# Patient Record
Sex: MALE | Race: WHITE | HISPANIC OR LATINO | Employment: FULL TIME | URBAN - METROPOLITAN AREA
[De-identification: names, ages, dates, MRNs, and addresses within clinical notes are randomized per-mention and may not be internally consistent; named-entity substitution may affect disease eponyms.]

---
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
## 2021-02-03 ENCOUNTER — HOSPITAL ENCOUNTER (OUTPATIENT)
Facility: MEDICAL CENTER | Age: 21
End: 2021-02-03
Attending: PHYSICIAN ASSISTANT
Payer: COMMERCIAL

## 2021-02-03 ENCOUNTER — OFFICE VISIT (OUTPATIENT)
Dept: URGENT CARE | Facility: CLINIC | Age: 21
End: 2021-02-03
Payer: COMMERCIAL

## 2021-02-03 VITALS
SYSTOLIC BLOOD PRESSURE: 112 MMHG | TEMPERATURE: 97 F | DIASTOLIC BLOOD PRESSURE: 76 MMHG | WEIGHT: 135 LBS | OXYGEN SATURATION: 97 % | HEIGHT: 69 IN | HEART RATE: 80 BPM | RESPIRATION RATE: 12 BRPM | BODY MASS INDEX: 19.99 KG/M2

## 2021-02-03 DIAGNOSIS — R36.9 PENILE DISCHARGE: ICD-10-CM

## 2021-02-03 DIAGNOSIS — R36.9 PENILE DISCHARGE: Primary | ICD-10-CM

## 2021-02-03 DIAGNOSIS — B08.1 MOLLUSCUM CONTAGIOSUM: ICD-10-CM

## 2021-02-03 DIAGNOSIS — Z20.2 POSSIBLE EXPOSURE TO STD: ICD-10-CM

## 2021-02-03 LAB
APPEARANCE UR: NORMAL
BILIRUB UR STRIP-MCNC: NEGATIVE MG/DL
COLOR UR AUTO: YELLOW
GLUCOSE UR STRIP.AUTO-MCNC: NEGATIVE MG/DL
KETONES UR STRIP.AUTO-MCNC: NORMAL MG/DL
LEUKOCYTE ESTERASE UR QL STRIP.AUTO: NORMAL
NITRITE UR QL STRIP.AUTO: NEGATIVE
PH UR STRIP.AUTO: 7.5 [PH] (ref 5–8)
PROT UR QL STRIP: NORMAL MG/DL
RBC UR QL AUTO: NORMAL
SP GR UR STRIP.AUTO: 1.02
UROBILINOGEN UR STRIP-MCNC: 0.2 MG/DL

## 2021-02-03 PROCEDURE — 87491 CHLMYD TRACH DNA AMP PROBE: CPT

## 2021-02-03 PROCEDURE — 99214 OFFICE O/P EST MOD 30 MIN: CPT | Mod: 25 | Performed by: PHYSICIAN ASSISTANT

## 2021-02-03 PROCEDURE — 81002 URINALYSIS NONAUTO W/O SCOPE: CPT | Performed by: PHYSICIAN ASSISTANT

## 2021-02-03 PROCEDURE — 87591 N.GONORRHOEAE DNA AMP PROB: CPT

## 2021-02-03 RX ORDER — AZITHROMYCIN 500 MG/1
1000 TABLET, FILM COATED ORAL ONCE
Qty: 2 TAB | Refills: 0 | Status: SHIPPED | OUTPATIENT
Start: 2021-02-03 | End: 2021-02-03

## 2021-02-03 ASSESSMENT — ENCOUNTER SYMPTOMS
FLANK PAIN: 0
FEVER: 0
CHILLS: 0
DIARRHEA: 0
VOMITING: 0
ABDOMINAL PAIN: 0
NAUSEA: 0
CONSTIPATION: 0
SHORTNESS OF BREATH: 0
COUGH: 0

## 2021-02-03 ASSESSMENT — FIBROSIS 4 INDEX: FIB4 SCORE: 0.47

## 2021-02-03 NOTE — PATIENT INSTRUCTIONS
Sexually Transmitted Disease  A sexually transmitted disease (STD) is a disease or infection that may be passed (transmitted) from person to person, usually during sexual activity. This may happen by way of saliva, semen, blood, vaginal mucus, or urine. Common STDs include:  · Gonorrhea.  · Chlamydia.  · Syphilis.  · HIV and AIDS.  · Genital herpes.  · Hepatitis B and C.  · Trichomonas.  · Human papillomavirus (HPV).  · Pubic lice.  · Scabies.  · Mites.  · Bacterial vaginosis.  What are the causes?  An STD may be caused by bacteria, a virus, or parasites. STDs are often transmitted during sexual activity if one person is infected. However, they may also be transmitted through nonsexual means. STDs may be transmitted after:  · Sexual intercourse with an infected person.  · Sharing sex toys with an infected person.  · Sharing needles with an infected person or using unclean piercing or tattoo needles.  · Having intimate contact with the genitals, mouth, or rectal areas of an infected person.  · Exposure to infected fluids during birth.  What are the signs or symptoms?  Different STDs have different symptoms. Some people may not have any symptoms. If symptoms are present, they may include:  · Painful or bloody urination.  · Pain in the pelvis, abdomen, vagina, anus, throat, or eyes.  · A skin rash, itching, or irritation.  · Growths, ulcerations, blisters, or sores in the genital and anal areas.  · Abnormal vaginal discharge with or without bad odor.  · Penile discharge in men.  · Fever.  · Pain or bleeding during sexual intercourse.  · Swollen glands in the groin area.  · Yellow skin and eyes (jaundice). This is seen with hepatitis.  · Swollen testicles.  · Infertility.  · Sores and blisters in the mouth.  How is this diagnosed?  To make a diagnosis, your health care provider may:  · Take a medical history.  · Perform a physical exam.  · Take a sample of any discharge to examine.  · Swab the throat, cervix, opening to  the penis, rectum, or vagina for testing.  · Test a sample of your first morning urine.  · Perform blood tests.  · Perform a Pap test, if this applies.  · Perform a colposcopy.  · Perform a laparoscopy.  How is this treated?  Treatment depends on the STD. Some STDs may be treated but not cured.  · Chlamydia, gonorrhea, trichomonas, and syphilis can be cured with antibiotic medicine.  · Genital herpes, hepatitis, and HIV can be treated, but not cured, with prescribed medicines. The medicines lessen symptoms.  · Genital warts from HPV can be treated with medicine or by freezing, burning (electrocautery), or surgery. Warts may come back.  · HPV cannot be cured with medicine or surgery. However, abnormal areas may be removed from the cervix, vagina, or vulva.  · If your diagnosis is confirmed, your recent sexual partners need treatment. This is true even if they are symptom-free or have a negative culture or evaluation. They should not have sex until their health care providers say it is okay.  · Your health care provider may test you for infection again 3 months after treatment.  How is this prevented?  Take these steps to reduce your risk of getting an STD:  · Use latex condoms, dental dams, and water-soluble lubricants during sexual activity. Do not use petroleum jelly or oils.  · Avoid having multiple sex partners.  · Do not have sex with someone who has other sex partners.  · Do not have sex with anyone you do not know or who is at high risk for an STD.  · Avoid risky sex practices that can break your skin.  · Do not have sex if you have open sores on your mouth or skin.  · Avoid drinking too much alcohol or taking illegal drugs. Alcohol and drugs can affect your judgment and put you in a vulnerable position.  · Avoid engaging in oral and anal sex acts.  · Get vaccinated for HPV and hepatitis. If you have not received these vaccines in the past, talk to your health care provider about whether one or both might be  right for you.  · If you are at risk of being infected with HIV, it is recommended that you take a prescription medicine daily to prevent HIV infection. This is called pre-exposure prophylaxis (PrEP). You are considered at risk if:  ¨ You are a man who has sex with other men (MSM).  ¨ You are a heterosexual man or woman and are sexually active with more than one partner.  ¨ You take drugs by injection.  ¨ You are sexually active with a partner who has HIV.  · Talk with your health care provider about whether you are at high risk of being infected with HIV. If you choose to begin PrEP, you should first be tested for HIV. You should then be tested every 3 months for as long as you are taking PrEP.  Contact a health care provider if:  · See your health care provider.  · Tell your sexual partner(s). They should be tested and treated for any STDs.  · Do not have sex until your health care provider says it is okay.  Get help right away if:  Contact your health care provider right away if:  · You have severe abdominal pain.  · You are a man and notice swelling or pain in your testicles.  · You are a woman and notice swelling or pain in your vagina.  This information is not intended to replace advice given to you by your health care provider. Make sure you discuss any questions you have with your health care provider.  Document Released: 03/09/2004 Document Revised: 07/07/2017 Document Reviewed: 07/08/2014  [employer] Interactive Patient Education © 2017 [employer]      Molluscum Contagiosum, Adult  Molluscum contagiosum is a skin infection that can cause a rash.  Your rash may go away on its own, or you may need to have a procedure or use medicine to treat the rash.  What are the causes?  This condition is caused by a virus. The virus can spread from person to person (is contagious). It can spread through:  · Skin-to-skin contact with an infected person.  · Contact with an object that has the virus on it (contaminated  object), such as a towel or clothing.  · Sexual activity.  What increases the risk?  You may be more likely to develop this condition if you:  · Live in an area where the weather is moist and warm.  · Have a weak disease-fighting system (immune system).  What are the signs or symptoms?  The main symptom of this condition is a painless rash that appears 2-7 weeks after exposure to the virus. The rash is made up of small, dome-shaped bumps on the skin. The bumps may:  · Affect the genitals, thighs, face, neck, or abdomen.  · Be pink or flesh-colored.  · Appear one by one or in groups.  · Range from the size of a pinhead to the size of a pencil eraser.  · Feel firm, smooth, and waxy.  · Have a pit in the middle.  · Itch. For most people, the rash does not itch.  How is this diagnosed?  This condition may be diagnosed based on:  · Your symptoms and medical history.  · A physical exam.  · Scraping the bumps to collect a skin sample for testing.  How is this treated?  The rash usually goes away within 2 months, but it can sometimes take 6-12 months for it to clear completely. For some people, the rash may go away on its own, without treatment. In some cases, treatment may be needed to keep the virus from infecting other people or to keep the rash from spreading to other parts of your body. Treatment may also be done if you have anxiety or stress because of the way the rash looks.  If you do need treatment, the options may include:  · Surgery to remove the bumps by freezing them (cryosurgery).  · A procedure to scrape off the bumps (curettage).  · A procedure to remove the bumps with a laser.  · Putting medicine on the bumps (topical treatment).  Follow these instructions at home:  General instructions  · Take or apply over-the-counter and prescription medicines only as told by your health care provider.  · Do not scratch or pick at the bumps. Scratching or picking can cause the rash to spread to other parts of your  body.  Preventing infection  As long as you have bumps on your skin, the infection can spread to other people. To prevent this from happening:  · Do not share clothing or towels with others until the bumps go away.  · Do not use a public swimming pool, sauna, or shower until the bumps go away.  · Avoid close contact with others until the bumps go away. This includes sexual contact.  · Wash your hands often with soap and water. If soap and water are not available, use hand .  · Cover the bumps with clothing or a bandage when you will be near other people.  Contact a health care provider if:  · The bumps are spreading.  · The bumps are becoming red and sore.  · The bumps have not gone away after 12 months.  Summary  · Molluscum contagiosum is a skin infection that can cause a rash made up of small, dome-shaped bumps.  · The infection is caused by a virus.  · The rash usually goes away within 2 months, but it can sometimes take 6-12 months for it to clear completely.  · The rash often goes away on its own. However, treatment is sometimes recommended to keep the virus from infecting other people or to keep it from spreading to other parts of your body.  This information is not intended to replace advice given to you by your health care provider. Make sure you discuss any questions you have with your health care provider.  Document Released: 07/15/2015 Document Revised: 04/10/2020 Document Reviewed: 12/31/2018  [employer] Patient Education © 2020 Else[employer] Inc.

## 2021-02-03 NOTE — PROGRESS NOTES
Subjective:   Jaydon Wayne is a 20 y.o. male who presents for UTI (x 2 days with pain with urination, clear discharge.  Thinks he may have had a fever below 100 but did not take it.  Denies chills or back pain, )        Dysuria   This is a new problem. Episode onset: 2 days  The problem has been unchanged. The quality of the pain is described as burning. The fever has been present for less than 1 day (subjective ). He is sexually active. Associated symptoms include a discharge (clear/white ) and hesitancy. Pertinent negatives include no chills, flank pain, frequency, hematuria, nausea, urgency or vomiting. He has tried nothing for the symptoms.     The patient recently had unprotected intercourse with a female partner 2 weeks ago.  He is unsure if his partner had symptoms.  He does not have any previous history of UTI or STD.    He has also noticed small bumps on his penis. No discharge or lesions, itching or pain.      Review of Systems   Constitutional: Negative for chills, fever and malaise/fatigue.   Respiratory: Negative for cough and shortness of breath.    Gastrointestinal: Negative for abdominal pain, constipation, diarrhea, nausea and vomiting.   Genitourinary: Positive for dysuria and hesitancy. Negative for flank pain, frequency, hematuria and urgency.   Skin: Positive for rash.   All other systems reviewed and are negative.      PMH:  has a past medical history of Anesthesia, Anxiety, Environmental allergies, Hypothyroid (5/15/2014), Infectious disease, and Snoring.  MEDS:   Current Outpatient Medications:   •  azithromycin (ZITHROMAX) 500 MG tablet, Take 2 Tabs by mouth one time for 1 dose., Disp: 2 Tab, Rfl: 0  •  levothyroxine (SYNTHROID) 150 MCG Tab, TAKE 1 TAB BY MOUTH EVERY MORNING ON AN EMPTY STOMACH., Disp: 30 Tab, Rfl: 3    Current Facility-Administered Medications:   •  cefTRIAXone (ROCEPHIN) 500 mg, lidocaine (XYLOCAINE) 1 % 1.8 mL for IM use, 500 mg, Intramuscular, Once, Jory Faulkner,  "P.A.-C.  ALLERGIES: No Known Allergies  SURGHX:   Past Surgical History:   Procedure Laterality Date   • WIDE EXCISION  8/25/2016    Procedure: WIDE EXCISION LOCAL DERMOID CYST NASAL ;  Surgeon: Akbar Zurita M.D.;  Location: SURGERY SAME DAY [facility];  Service:    • TONSILLECTOMY AND ADENOIDECTOMY  12/28/2011    Performed by VENKATESH ALLISON at SURGERY SAME DAY [facility]   • OTHER      dental      SOCHX:  reports that he has never smoked. He has never used smokeless tobacco. He reports that he does not drink alcohol or use drugs.  Family History   Problem Relation Age of Onset   • Allergies Mother    • Allergies Father    • Thyroid Father    • Psychiatric Illness Father         social anxiety        Objective:   /76   Pulse 80   Temp 36.1 °C (97 °F) (Temporal)   Resp 12   Ht 1.753 m (5' 9\")   Wt 61.2 kg (135 lb)   SpO2 97%   BMI 19.94 kg/m²     Physical Exam  Vitals signs reviewed.   Constitutional:       General: He is not in acute distress.     Appearance: He is well-developed.   HENT:      Head: Normocephalic and atraumatic.      Right Ear: External ear normal.      Left Ear: External ear normal.      Nose: Nose normal.      Mouth/Throat:      Mouth: Mucous membranes are moist.   Eyes:      Conjunctiva/sclera: Conjunctivae normal.      Pupils: Pupils are equal, round, and reactive to light.   Neck:      Musculoskeletal: Normal range of motion and neck supple.      Trachea: No tracheal deviation.   Cardiovascular:      Rate and Rhythm: Normal rate and regular rhythm.   Pulmonary:      Effort: Pulmonary effort is normal.      Breath sounds: Normal breath sounds.   Genitourinary:      Skin:     General: Skin is warm and dry.      Capillary Refill: Capillary refill takes less than 2 seconds.   Neurological:      General: No focal deficit present.      Mental Status: He is alert and oriented to person, place, and time.   Psychiatric:         Mood and Affect: Mood normal.         Behavior: " Behavior normal.           Assessment/Plan:     1. Penile discharge  POCT Urinalysis    Chlamydia/GC PCR Urine Or Swab    azithromycin (ZITHROMAX) 500 MG tablet    cefTRIAXone (ROCEPHIN) 500 mg, lidocaine (XYLOCAINE) 1 % 1.8 mL for IM use    REFERRAL TO FOLLOW-UP WITH PRIMARY CARE   2. Possible exposure to STD     3. Molluscum contagiosum       He will be notified of final lab results and treated empirically for gonorrhea chlamydia.  He will refrain from intercourse for 1 week.  Information on STDs and molluscum provided.    Follow-up with primary care provider within 7-10 days, referral placed.  If symptoms worsen or persist patient can return to clinic for reevaluation. All side effects of medication discussed including allergic response, GI upset, tendon injury, etc. Patient confirmed understanding of information.    Please note that this dictation was created using voice recognition software. I have made every reasonable attempt to correct obvious errors, but I expect that there are errors of grammar and possibly content that I did not discover before finalizing the note.

## 2021-02-05 LAB
C TRACH DNA SPEC QL NAA+PROBE: NEGATIVE
N GONORRHOEA DNA SPEC QL NAA+PROBE: POSITIVE
SPECIMEN SOURCE: ABNORMAL

## 2021-02-25 DIAGNOSIS — E03.9 HYPOTHYROIDISM (ACQUIRED): ICD-10-CM

## 2021-03-06 RX ORDER — LEVOTHYROXINE SODIUM 0.15 MG/1
150 TABLET ORAL
Qty: 30 TABLET | Refills: 3 | Status: SHIPPED | OUTPATIENT
Start: 2021-03-06 | End: 2021-05-20

## 2021-05-06 ENCOUNTER — TELEPHONE (OUTPATIENT)
Dept: PEDIATRIC ENDOCRINOLOGY | Facility: MEDICAL CENTER | Age: 21
End: 2021-05-06

## 2021-05-06 DIAGNOSIS — E03.9 HYPOTHYROIDISM (ACQUIRED): ICD-10-CM

## 2021-05-06 NOTE — TELEPHONE ENCOUNTER
Mom called for Jaydon. Mom said that Jaydon has an appointment 05/12 and would like to get labs before then.

## 2021-05-19 ENCOUNTER — HOSPITAL ENCOUNTER (OUTPATIENT)
Dept: LAB | Facility: MEDICAL CENTER | Age: 21
End: 2021-05-19
Attending: PEDIATRICS
Payer: COMMERCIAL

## 2021-05-19 DIAGNOSIS — E03.9 HYPOTHYROIDISM (ACQUIRED): ICD-10-CM

## 2021-05-19 LAB
T4 FREE SERPL-MCNC: 1.4 NG/DL (ref 0.93–1.7)
TSH SERPL DL<=0.005 MIU/L-ACNC: 2.7 UIU/ML (ref 0.38–5.33)

## 2021-05-19 PROCEDURE — 84443 ASSAY THYROID STIM HORMONE: CPT

## 2021-05-19 PROCEDURE — 36415 COLL VENOUS BLD VENIPUNCTURE: CPT

## 2021-05-19 PROCEDURE — 84439 ASSAY OF FREE THYROXINE: CPT

## 2021-05-20 ENCOUNTER — OFFICE VISIT (OUTPATIENT)
Dept: PEDIATRIC ENDOCRINOLOGY | Facility: MEDICAL CENTER | Age: 21
End: 2021-05-20
Payer: COMMERCIAL

## 2021-05-20 VITALS
HEIGHT: 68 IN | DIASTOLIC BLOOD PRESSURE: 76 MMHG | HEART RATE: 100 BPM | WEIGHT: 134.26 LBS | BODY MASS INDEX: 20.35 KG/M2 | SYSTOLIC BLOOD PRESSURE: 126 MMHG

## 2021-05-20 DIAGNOSIS — E03.9 ACQUIRED HYPOTHYROIDISM: ICD-10-CM

## 2021-05-20 PROCEDURE — 99214 OFFICE O/P EST MOD 30 MIN: CPT | Performed by: PEDIATRICS

## 2021-05-20 RX ORDER — LEVOTHYROXINE SODIUM 0.07 MG/1
75 TABLET ORAL DAILY
Qty: 30 TABLET | Refills: 5 | Status: SHIPPED | OUTPATIENT
Start: 2021-05-20 | End: 2021-06-03

## 2021-05-20 ASSESSMENT — FIBROSIS 4 INDEX: FIB4 SCORE: 0.47

## 2021-05-20 NOTE — PROGRESS NOTES
Pediatric Endocrinology Clinic Note  [facility], [place], [place]  Phone: [phone]    Clinic Date: 05/20/21    Chief Complaint   Patient presents with   • Follow-Up     Hypothyroidism (acquired)       Identification: Jaydon Wayne is a 20 y.o. male with history of acquired hypothyroidism returns to our pediatric endocrine clinic for follow-up.  Historically followed by Dr. Isaac in the pediatric endocrine clinic, last visit on telemedicine on 4/30/2020.   First visit with Dr. Lara on 5/20/2021.     Historians: Patient, Epic records    History of present illness: Jaydon has been followed in the pediatric endocrine clinic by Dr. Isaac in the context of acquired hypothyroidism. His previous PCP was Dr Arora who initially referred the patient.  H/o acquired hypothyroidism diagnosed in the context of fatigue.    Labs done January 2014: TSH 6.3 free T4 0.62. Repeat labs on 2/26/14: TSH 10.57 free T4 0.70 ng/dL Anti-thyroglobulin 35.6 IU/mL (0-4.0) TPO antibody 530.4 IU/mL (0-9.0). CBC remarkable for mild hypochromic microcytic anemia. Hemoglobin electrophoresis was normal. Bone age X-ray : CA 12y 1 mo, BA read by Dr Isaac 11 y 6 mo; with this, his predicted height was within his genetic potential. Labs done April 2014: TSH 0.16 free T4 1.09.  Highest TSH available in Epic was 10.57 (Feb 2014).Unclear to Dr Monroe when thyroid medication was exactly started (Feb 2014?).  Levothyroxine doses were adjusted based on follow-up labs, and slowly increased in time.    H/o depression and anxiety, worse in the context of COVID-19 pandemic.In 2017 used to be on Celexa, per Dr Isaac's note (1/24/17). Seen by Dr Diehl (Psychiatry) in the past.  H/o learning issues, speech delay, social anxiety. Seen by Dr Heller (Psychology) in the past.    Interval History: Since the last visit in our office on 4/30/2020 with Dr. Isaac, Jaydon has been doing well.  He is prescribed levothyroxine dose was 150 mcg daily p.o.  He reports partial  "adherence to therapy: taking his pill ~ once a week for many months.  2-3 weeks ago he developed various symptoms: feeling cold, achy,  tired, having sensitive skin.  At that time he required a lot of sleep.  Since his compliance has been suboptimal, he decided to restart therapy at half of his regular dose, 75 mcg daily, which he has been taking daily for the past 2-3 weeks.  Since he restarted the thyroid medication, has been feeling much better, and all of the above symptoms have resolved.  He had thyroid labs done on 5/19/2021.  Otherwise he has been healthy.  No new medical problems.      Review of systems:   Denies acute complaints today    Past Medical History:   Diagnosis Date   • Anesthesia     PONV    • Anxiety    • Environmental allergies    • Hypothyroid 5/15/2014   • Infectious disease     mono   • Snoring        Past Surgical History:   Procedure Laterality Date   • WIDE EXCISION  8/25/2016    Procedure: WIDE EXCISION LOCAL DERMOID CYST NASAL ;  Surgeon: Akbar Zurita M.D.;  Location: SURGERY SAME DAY [facility];  Service:    • TONSILLECTOMY AND ADENOIDECTOMY  12/28/2011    Performed by VENKATESH ALLISON at SURGERY SAME DAY [facility] ORS   • OTHER      dental          Current Outpatient Medications   Medication Sig Dispense Refill   • levothyroxine (SYNTHROID) 75 MCG Tab Take 1 tablet by mouth every day. 30 tablet 5     No current facility-administered medications for this visit.       Allergies: Patient has no known allergies.    Social History     Social History Narrative    Finished high school in the summer 2020.  Currently has a job.    Lives with parents.         Family History   Problem Relation Age of Onset   • Allergies Mother    • Allergies Father    • Thyroid Father    • Psychiatric Illness Father         social anxiety         Vital Signs: /76 (BP Location: Right arm, Patient Position: Sitting, BP Cuff Size: Adult)   Pulse 100   Ht 1.737 m (5' 8.4\")   Wt 60.9 kg (134 lb 4.2 " oz)  Body mass index is 20.18 kg/m².    Physical Exam:  General: Well appearing child, in no distress  Eyes: No redness, no discharge  HENT: Normocephalic, atraumatic; masculine voice  Neck: Supple, no LAD/thyromegaly, no palpable thyroid nodules  Lungs: CTA b/l, no wheezing/ rales/ crackles  Heart: RRR, normal S1 and S2, no murmurs, cap refill <3sec  Abd: Soft, non tender and non distended, no palpable masses or organomegaly  Ext: No edema  Skin: No obvious rash  Neuro: Alert, interacting appropriately; grossly no focal deficits  : Deferred      Laboratory data:             Encounter Diagnoses:  1. Acquired hypothyroidism  levothyroxine (SYNTHROID) 75 MCG Tab    FREE THYROXINE    TSH    THYROID PEROXIDASE  (TPO) AB    ANTITHYROGLOBULIN AB       Assessment: Jaydon Wayne is a 20 y.o. male with history of acquired hypothyroidism and suboptimal adherence to therapy, who returns to our Pediatric Endocrine Clinic after > 1 year gap in his follow-up.  Historically he has been missing multiple levothyroxine doses, mother reports that for the past 2-3 weeks he has been taking half of his regular dose which is 75 mcg daily.  His most recent labs on 5/19/2021 are normal.  Appears euthyroid per history and exam today.  Discussed with him the importance of compliance when managing thyroid disease.  He should take the pill every day, if he misses the pill, he should take it later in the day, or if he remembers the next day he can double up the dose.    Recommendations:  - Laboratory work-up: TSH and free T4; thyroid antibodies in 6 weeks  - Continue levothyroxine 75 mcg daily p.o.    He needs to establish care with a new primary care doctor who could potentially follow and treat his thyroid disease.  Discussed transition to adult care.  For now I am going to continue follow-up until I find a good dose for his thyroid therapy.    Return in about 4 months (around 9/20/2021).    Please note: This note was created by dictation  using voice recognition software. I have made every reasonable attempt to correct obvious errors, but I expect that there are errors of grammar and possibly content that I did not discover before finalizing the note.      Edith Monroe M.D.  Pediatric Endocrinology

## 2021-09-28 ENCOUNTER — APPOINTMENT (OUTPATIENT)
Dept: PEDIATRIC ENDOCRINOLOGY | Facility: MEDICAL CENTER | Age: 21
End: 2021-09-28
Payer: COMMERCIAL

## 2022-02-21 ENCOUNTER — HOSPITAL ENCOUNTER (OUTPATIENT)
Facility: MEDICAL CENTER | Age: 22
End: 2022-02-21
Attending: PHYSICIAN ASSISTANT
Payer: COMMERCIAL

## 2022-02-21 ENCOUNTER — OFFICE VISIT (OUTPATIENT)
Dept: URGENT CARE | Facility: CLINIC | Age: 22
End: 2022-02-21
Payer: COMMERCIAL

## 2022-02-21 VITALS
SYSTOLIC BLOOD PRESSURE: 114 MMHG | HEART RATE: 90 BPM | DIASTOLIC BLOOD PRESSURE: 76 MMHG | RESPIRATION RATE: 20 BRPM | BODY MASS INDEX: 19.7 KG/M2 | OXYGEN SATURATION: 94 % | HEIGHT: 68 IN | TEMPERATURE: 97 F | WEIGHT: 130 LBS

## 2022-02-21 DIAGNOSIS — J06.9 UPPER RESPIRATORY TRACT INFECTION, UNSPECIFIED TYPE: ICD-10-CM

## 2022-02-21 DIAGNOSIS — J02.9 SORE THROAT: ICD-10-CM

## 2022-02-21 LAB
INT CON NEG: NEGATIVE
INT CON POS: POSITIVE
S PYO AG THROAT QL: NEGATIVE

## 2022-02-21 PROCEDURE — 99213 OFFICE O/P EST LOW 20 MIN: CPT | Performed by: PHYSICIAN ASSISTANT

## 2022-02-21 PROCEDURE — 87880 STREP A ASSAY W/OPTIC: CPT | Performed by: PHYSICIAN ASSISTANT

## 2022-02-21 PROCEDURE — U0003 INFECTIOUS AGENT DETECTION BY NUCLEIC ACID (DNA OR RNA); SEVERE ACUTE RESPIRATORY SYNDROME CORONAVIRUS 2 (SARS-COV-2) (CORONAVIRUS DISEASE [COVID-19]), AMPLIFIED PROBE TECHNIQUE, MAKING USE OF HIGH THROUGHPUT TECHNOLOGIES AS DESCRIBED BY CMS-2020-01-R: HCPCS

## 2022-02-21 PROCEDURE — U0005 INFEC AGEN DETEC AMPLI PROBE: HCPCS

## 2022-02-21 RX ORDER — LIDOCAINE HYDROCHLORIDE 20 MG/ML
15 SOLUTION OROPHARYNGEAL PRN
Qty: 600 ML | Refills: 0 | Status: SHIPPED | OUTPATIENT
Start: 2022-02-21 | End: 2022-06-29

## 2022-02-21 ASSESSMENT — ENCOUNTER SYMPTOMS
COUGH: 1
NECK PAIN: 0
SPUTUM PRODUCTION: 1
TROUBLE SWALLOWING: 1
MYALGIAS: 0
EYE DISCHARGE: 0
HOARSE VOICE: 1
WHEEZING: 0
CHILLS: 0
EYE REDNESS: 0
FEVER: 0
DIARRHEA: 0
VOMITING: 0
SWOLLEN GLANDS: 0
DIZZINESS: 0
HEADACHES: 1
SHORTNESS OF BREATH: 0
SORE THROAT: 1

## 2022-02-21 NOTE — PROGRESS NOTES
"Subjective     Jaydon Wayne is a 21 y.o. male who presents with Difficulty Swallowing (Painful to talk/swallow x 4 days)            Pharyngitis   This is a new problem. The current episode started in the past 7 days. The problem has been unchanged. There has been no fever. Associated symptoms include congestion, coughing, headaches, a hoarse voice and trouble swallowing. Pertinent negatives include no diarrhea, ear discharge, ear pain, neck pain, shortness of breath, swollen glands or vomiting.   HX of tonsillectomy.   Denies any vaccination, denies ill exposures to covid.   Taking Advil with mild improvement of symptoms.       Review of Systems   Constitutional: Positive for malaise/fatigue. Negative for chills and fever.   HENT: Positive for congestion, hoarse voice, sore throat and trouble swallowing. Negative for ear discharge and ear pain.    Eyes: Negative for discharge and redness.   Respiratory: Positive for cough and sputum production. Negative for shortness of breath and wheezing.    Gastrointestinal: Negative for diarrhea and vomiting.   Genitourinary: Negative for dysuria and urgency.   Musculoskeletal: Negative for myalgias and neck pain.   Skin: Negative for itching and rash.   Neurological: Positive for headaches. Negative for dizziness.              Objective     /76 (BP Location: Left arm, Patient Position: Sitting, BP Cuff Size: Adult)   Pulse 90   Temp 36.1 °C (97 °F) (Temporal)   Resp 20   Ht 1.727 m (5' 8\")   Wt 59 kg (130 lb)   SpO2 94%   BMI 19.77 kg/m²    PMH:  has a past medical history of Anesthesia, Anxiety, Environmental allergies, Hypothyroid (5/15/2014), Infectious disease, and Snoring.  MEDS: Reviewed .   ALLERGIES: No Known Allergies  SURGHX:   Past Surgical History:   Procedure Laterality Date   • WIDE EXCISION  8/25/2016    Procedure: WIDE EXCISION LOCAL DERMOID CYST NASAL ;  Surgeon: Akbar Zurita M.D.;  Location: SURGERY SAME DAY [facility];  Service:    • " TONSILLECTOMY AND ADENOIDECTOMY  12/28/2011    Performed by VENKATESH ALLISON at SURGERY SAME DAY [facility] ORS   • OTHER      dental      SOCHX:  reports that he has never smoked. He has never used smokeless tobacco. He reports that he does not drink alcohol and does not use drugs.  FH: Family history was reviewed, no pertinent findings to report    Physical Exam  Vitals reviewed.   Constitutional:       General: He is not in acute distress.     Appearance: He is well-developed.   HENT:      Head: Normocephalic and atraumatic.      Right Ear: External ear normal.      Left Ear: External ear normal.      Nose: Congestion present.      Mouth/Throat:      Pharynx: Posterior oropharyngeal erythema present.   Eyes:      Conjunctiva/sclera: Conjunctivae normal.      Pupils: Pupils are equal, round, and reactive to light.   Neck:      Trachea: No tracheal deviation.   Cardiovascular:      Rate and Rhythm: Normal rate and regular rhythm.   Pulmonary:      Effort: Pulmonary effort is normal.      Breath sounds: Normal breath sounds.   Musculoskeletal:         General: Normal range of motion.      Cervical back: Normal range of motion and neck supple.   Lymphadenopathy:      Cervical: No cervical adenopathy.   Skin:     General: Skin is warm.      Findings: No rash.      Comments: No rash to area exposed during the visit today.    Neurological:      Mental Status: He is alert and oriented to person, place, and time.      Coordination: Coordination normal.   Psychiatric:         Behavior: Behavior normal.         Thought Content: Thought content normal.         Judgment: Judgment normal.                           POC strep- negative.     Assessment & Plan        1. Sore throat  - POCT Rapid Strep A  - lidocaine (XYLOCAINE) 2 % Solution; Take 15 mL by mouth as needed for Throat/Mouth Pain. Gargle and spit every 4 hours as needed for pain.  Dispense: 600 mL; Refill: 0    2. Upper respiratory tract infection, unspecified type        Appropriate PPE worn at all times by provider.   Pt. Had face mask on throughout entirety of the visit other than oropharyngeal examination today.     Testing performed for COVID-19.    Patient currently without indication of need for higher level of care.    Reviewed with patient/guardian that if they do test positive they will be contacted by their local health department regarding return to work/school protocols.  Results will also be released to patient/guardian in MyChart or called to the patient/guardian directly. Discussed isolation/quarantine recommendations.  Encouraged mask use, frequent handwashing, wiping down hard surfaces, etc.    Patient and/or guardian given precautionary s/sx that mandate immediate follow up and evaluation in the ED. Advised of risks of not doing so.  Side effects of OTC or prescribed medications discussed.   DDX, Supportive care, and indications for immediate follow-up discussed.  Instructed to return to clinic or nearest emergency department if we are not available for any change in condition, further concerns, or worsening of symptoms.    The patient and/or guardian demonstrated a good understanding and agreed with the treatment plan.    Please note that this dictation was created using voice recognition software. I have made every reasonable attempt to correct obvious errors, but I expect that there are errors of grammar and possibly content that I did not discover before finalizing the note.

## 2022-02-21 NOTE — LETTER
February 21, 2022    To Whom It May Concern:         This is confirmation that Jaydon I Wayne attended his scheduled appointment with Emil Sweeney P.A.-C. on 2/21/22.         If you have any questions please do not hesitate to call me at the phone number listed below.    Sincerely,        Emil Sweeney P.A.-C.  [phone]

## 2022-02-22 LAB
COVID ORDER STATUS COVID19: NORMAL
SARS-COV-2 RNA RESP QL NAA+PROBE: NOTDETECTED
SPECIMEN SOURCE: NORMAL

## 2022-02-23 ENCOUNTER — PATIENT MESSAGE (OUTPATIENT)
Dept: URGENT CARE | Facility: CLINIC | Age: 22
End: 2022-02-23
Payer: COMMERCIAL

## 2022-02-24 ENCOUNTER — OFFICE VISIT (OUTPATIENT)
Dept: URGENT CARE | Facility: CLINIC | Age: 22
End: 2022-02-24
Payer: COMMERCIAL

## 2022-02-24 VITALS
BODY MASS INDEX: 19.26 KG/M2 | RESPIRATION RATE: 20 BRPM | HEIGHT: 69 IN | WEIGHT: 130 LBS | SYSTOLIC BLOOD PRESSURE: 118 MMHG | TEMPERATURE: 97.8 F | OXYGEN SATURATION: 95 % | HEART RATE: 100 BPM | DIASTOLIC BLOOD PRESSURE: 82 MMHG

## 2022-02-24 DIAGNOSIS — J02.9 PHARYNGITIS, UNSPECIFIED ETIOLOGY: ICD-10-CM

## 2022-02-24 PROCEDURE — 99213 OFFICE O/P EST LOW 20 MIN: CPT | Performed by: FAMILY MEDICINE

## 2022-02-24 RX ORDER — AMOXICILLIN 500 MG/1
500 CAPSULE ORAL 2 TIMES DAILY
Qty: 20 CAPSULE | Refills: 0 | Status: SHIPPED | OUTPATIENT
Start: 2022-02-24 | End: 2022-03-06

## 2022-02-24 ASSESSMENT — ENCOUNTER SYMPTOMS
EYE DISCHARGE: 0
MYALGIAS: 0
NAUSEA: 0
EYE REDNESS: 0
WEIGHT LOSS: 0
VOMITING: 0

## 2022-02-24 NOTE — PROGRESS NOTES
"Subjective     Jaydon Wayne is a 21 y.o. male who presents with Pharyngitis (Cough/runny nose/x1week)            1 week sore throat.  Seen previously at the [facility] urgent care with negative strep and Covid testing.  No fever.  He is having difficulty swallowing but getting some fluids down and continues to urinate.  Mild cough.  No shortness of breath or wheezing.  Minimal relief with lidocaine gargle.  No other aggravating or alleviating factors.      Review of Systems   Constitutional: Negative for malaise/fatigue and weight loss.   Eyes: Negative for discharge and redness.   Gastrointestinal: Negative for nausea and vomiting.   Musculoskeletal: Negative for joint pain and myalgias.   Skin: Negative for itching and rash.              Objective     /82 (BP Location: Left arm, Patient Position: Sitting)   Pulse 100   Temp 36.6 °C (97.8 °F) (Temporal)   Resp 20   Ht 1.753 m (5' 9\")   Wt 59 kg (130 lb)   SpO2 95%   BMI 19.20 kg/m²      Physical Exam  Constitutional:       General: He is not in acute distress.     Appearance: He is well-developed.   HENT:      Head: Normocephalic and atraumatic.      Mouth/Throat:      Pharynx: Posterior oropharyngeal erythema present. No oropharyngeal exudate.   Eyes:      Conjunctiva/sclera: Conjunctivae normal.   Cardiovascular:      Rate and Rhythm: Normal rate and regular rhythm.      Heart sounds: Normal heart sounds. No murmur heard.  Pulmonary:      Effort: Pulmonary effort is normal.      Breath sounds: Normal breath sounds. No wheezing.   Musculoskeletal:      Cervical back: Neck supple.   Lymphadenopathy:      Cervical: Cervical adenopathy present.   Skin:     General: Skin is warm and dry.      Findings: No rash.   Neurological:      Mental Status: He is alert and oriented to person, place, and time.                             Assessment & Plan   POCT strep 2/21/2022 reviewed  PCR COVID-19 2/21/2022 reviewed  Urgent care visit 2/21/2022 reviewed     1. " Pharyngitis, unspecified etiology    - amoxicillin (AMOXIL) 500 MG Cap; Take 1 Capsule by mouth 2 times a day for 10 days.  Dispense: 20 Capsule; Refill: 0    Differential diagnosis, natural history, supportive care, and indications for immediate follow-up discussed at length.     Discussed remains high probability of viral etiology.  He will continue supportive measures through the weekend and initiate antibiotic if no improvement or developing fevers.

## 2022-02-24 NOTE — LETTER
February 24, 2022         Patient: Jaydon Wayne   YOB: 2000   Date of Visit: 2/24/2022           To Whom it May Concern:    Jaydon Wayne was seen in my clinic on 2/24/2022. Please excuse from work today.      Sincerely,           Jeronimo Vargas M.D.  Electronically Signed

## 2022-06-01 ENCOUNTER — TELEPHONE (OUTPATIENT)
Dept: PEDIATRIC ENDOCRINOLOGY | Facility: MEDICAL CENTER | Age: 22
End: 2022-06-01
Payer: COMMERCIAL

## 2022-06-01 DIAGNOSIS — E03.9 ACQUIRED HYPOTHYROIDISM: ICD-10-CM

## 2022-06-01 NOTE — TELEPHONE ENCOUNTER
Pt called requesting a refferal to adult endo    [facility]am Her[facility]. Phone

## 2022-06-29 ENCOUNTER — OFFICE VISIT (OUTPATIENT)
Dept: MEDICAL GROUP | Facility: MEDICAL CENTER | Age: 22
End: 2022-06-29
Payer: COMMERCIAL

## 2022-06-29 VITALS
OXYGEN SATURATION: 100 % | DIASTOLIC BLOOD PRESSURE: 62 MMHG | TEMPERATURE: 98 F | HEART RATE: 129 BPM | BODY MASS INDEX: 18.47 KG/M2 | HEIGHT: 70 IN | WEIGHT: 129 LBS | SYSTOLIC BLOOD PRESSURE: 112 MMHG

## 2022-06-29 DIAGNOSIS — E03.9 ACQUIRED HYPOTHYROIDISM: ICD-10-CM

## 2022-06-29 DIAGNOSIS — R19.03 RIGHT LOWER QUADRANT ABDOMINAL MASS: ICD-10-CM

## 2022-06-29 DIAGNOSIS — Z00.00 HEALTH CARE MAINTENANCE: ICD-10-CM

## 2022-06-29 PROBLEM — Z79.899 ENCOUNTER FOR LONG-TERM (CURRENT) USE OF MEDICATIONS: Status: RESOLVED | Noted: 2017-08-04 | Resolved: 2022-06-29

## 2022-06-29 PROCEDURE — 99213 OFFICE O/P EST LOW 20 MIN: CPT | Performed by: STUDENT IN AN ORGANIZED HEALTH CARE EDUCATION/TRAINING PROGRAM

## 2022-06-29 ASSESSMENT — ENCOUNTER SYMPTOMS
WEIGHT LOSS: 0
FEVER: 0
CHILLS: 0
SHORTNESS OF BREATH: 0

## 2022-06-29 NOTE — PROGRESS NOTES
"Subjective:     CC:  Diagnoses of Acquired hypothyroidism, Right lower quadrant abdominal mass, and Health care maintenance were pertinent to this visit.    HISTORY OF THE PRESENT ILLNESS: Patient is a 21 y.o. male with the following medical problems   Past Medical History:   Diagnosis Date   • Anesthesia     PONV    • Anxiety    • Environmental allergies    • Hypothyroid 5/15/2014   • Infectious disease     mono   • Snoring      . 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 He has been off of his medications for 1 year or so. He has been feeling very tired     Encounter for Long-Term (Current) Use of Medications (Resolved)   Social anxiety disorder (Resolved)    IMO load March 2020     Irregular Sleep-Wake Rhythm, Nonorganic Origin (Resolved)   Dermoid Cyst of Face (Resolved)   Speech Articulation Disorder (Resolved)   Academic Underachievement (Resolved)       Current Outpatient Medications Ordered in Epic   Medication Sig Dispense Refill   • levothyroxine (SYNTHROID) 75 MCG Tab TAKE 1 TABLET BY MOUTH EVERY DAY (Patient not taking: Reported on 6/29/2022) 90 tablet 1     No current Epic-ordered facility-administered medications on file.         ROS:   Review of Systems   Constitutional: Positive for malaise/fatigue. Negative for chills, fever and weight loss.   Respiratory: Negative for shortness of breath.    Cardiovascular: Negative for chest pain.         Objective:     Exam: /62 (BP Location: Left arm, Patient Position: Sitting, BP Cuff Size: Adult)   Pulse (!) 129   Temp 36.7 °C (98 °F) (Temporal)   Ht 1.778 m (5' 10\")   Wt 58.5 kg (129 lb)   SpO2 100%  Body mass index is " 18.51 kg/m².    Physical Exam  Constitutional:       General: He is not in acute distress.     Appearance: He is not ill-appearing.   Pulmonary:      Effort: Pulmonary effort is normal.   Neurological:      Mental Status: He is alert.   Psychiatric:         Mood and Affect: Mood normal.         Behavior: Behavior normal.         Thought Content: Thought content normal.         Judgment: Judgment normal.               Assessment & Plan:     Problem List Items Addressed This Visit     Hypothyroid     Chronic-not controlled   -check thyroid function             Right lower quadrant abdominal mass    Relevant Orders    US-ABDOMEN COMPLETE SURVEY      Other Visit Diagnoses     Health care maintenance        Relevant Orders    HEMOGLOBIN A1C    CBC WITH DIFFERENTIAL    Comp Metabolic Panel    TSH WITH REFLEX TO FT4    Lipid Profile            Return in about 2 weeks (around 7/13/2022) for Annual male .    Please note that this dictation was created using voice recognition software. I have made every reasonable attempt to correct obvious errors, but I expect that there are errors of grammar and possibly content that I did not discover before finalizing the note.

## 2022-07-02 ENCOUNTER — HOSPITAL ENCOUNTER (OUTPATIENT)
Dept: LAB | Facility: MEDICAL CENTER | Age: 22
End: 2022-07-02
Attending: STUDENT IN AN ORGANIZED HEALTH CARE EDUCATION/TRAINING PROGRAM
Payer: COMMERCIAL

## 2022-07-02 DIAGNOSIS — Z00.00 HEALTH CARE MAINTENANCE: ICD-10-CM

## 2022-07-02 LAB
ALBUMIN SERPL BCP-MCNC: 4.9 G/DL (ref 3.2–4.9)
ALBUMIN/GLOB SERPL: 1.6 G/DL
ALP SERPL-CCNC: 102 U/L (ref 30–99)
ALT SERPL-CCNC: 26 U/L (ref 2–50)
ANION GAP SERPL CALC-SCNC: 12 MMOL/L (ref 7–16)
AST SERPL-CCNC: 14 U/L (ref 12–45)
BASOPHILS # BLD AUTO: 0.7 % (ref 0–1.8)
BASOPHILS # BLD: 0.04 K/UL (ref 0–0.12)
BILIRUB SERPL-MCNC: 0.7 MG/DL (ref 0.1–1.5)
BUN SERPL-MCNC: 12 MG/DL (ref 8–22)
CALCIUM SERPL-MCNC: 10 MG/DL (ref 8.5–10.5)
CHLORIDE SERPL-SCNC: 102 MMOL/L (ref 96–112)
CHOLEST SERPL-MCNC: 143 MG/DL (ref 100–199)
CO2 SERPL-SCNC: 25 MMOL/L (ref 20–33)
CREAT SERPL-MCNC: 1.06 MG/DL (ref 0.5–1.4)
EOSINOPHIL # BLD AUTO: 0.11 K/UL (ref 0–0.51)
EOSINOPHIL NFR BLD: 1.8 % (ref 0–6.9)
ERYTHROCYTE [DISTWIDTH] IN BLOOD BY AUTOMATED COUNT: 41.2 FL (ref 35.9–50)
EST. AVERAGE GLUCOSE BLD GHB EST-MCNC: 105 MG/DL
GFR SERPLBLD CREATININE-BSD FMLA CKD-EPI: 103 ML/MIN/1.73 M 2
GLOBULIN SER CALC-MCNC: 3.1 G/DL (ref 1.9–3.5)
GLUCOSE SERPL-MCNC: 84 MG/DL (ref 65–99)
HBA1C MFR BLD: 5.3 % (ref 4–5.6)
HCT VFR BLD AUTO: 45.8 % (ref 42–52)
HDLC SERPL-MCNC: 43 MG/DL
HGB BLD-MCNC: 14.6 G/DL (ref 14–18)
IMM GRANULOCYTES # BLD AUTO: 0.01 K/UL (ref 0–0.11)
IMM GRANULOCYTES NFR BLD AUTO: 0.2 % (ref 0–0.9)
LDLC SERPL CALC-MCNC: 86 MG/DL
LYMPHOCYTES # BLD AUTO: 1.58 K/UL (ref 1–4.8)
LYMPHOCYTES NFR BLD: 26 % (ref 22–41)
MCH RBC QN AUTO: 25.8 PG (ref 27–33)
MCHC RBC AUTO-ENTMCNC: 31.9 G/DL (ref 33.7–35.3)
MCV RBC AUTO: 80.9 FL (ref 81.4–97.8)
MONOCYTES # BLD AUTO: 0.54 K/UL (ref 0–0.85)
MONOCYTES NFR BLD AUTO: 8.9 % (ref 0–13.4)
NEUTROPHILS # BLD AUTO: 3.79 K/UL (ref 1.82–7.42)
NEUTROPHILS NFR BLD: 62.4 % (ref 44–72)
NRBC # BLD AUTO: 0 K/UL
NRBC BLD-RTO: 0 /100 WBC
PLATELET # BLD AUTO: 266 K/UL (ref 164–446)
PMV BLD AUTO: 10.1 FL (ref 9–12.9)
POTASSIUM SERPL-SCNC: 4.5 MMOL/L (ref 3.6–5.5)
PROT SERPL-MCNC: 8 G/DL (ref 6–8.2)
RBC # BLD AUTO: 5.66 M/UL (ref 4.7–6.1)
SODIUM SERPL-SCNC: 139 MMOL/L (ref 135–145)
TRIGL SERPL-MCNC: 70 MG/DL (ref 0–149)
TSH SERPL DL<=0.005 MIU/L-ACNC: 2.57 UIU/ML (ref 0.38–5.33)
WBC # BLD AUTO: 6.1 K/UL (ref 4.8–10.8)

## 2022-07-02 PROCEDURE — 80053 COMPREHEN METABOLIC PANEL: CPT

## 2022-07-02 PROCEDURE — 84443 ASSAY THYROID STIM HORMONE: CPT

## 2022-07-02 PROCEDURE — 85025 COMPLETE CBC W/AUTO DIFF WBC: CPT

## 2022-07-02 PROCEDURE — 36415 COLL VENOUS BLD VENIPUNCTURE: CPT

## 2022-07-02 PROCEDURE — 83036 HEMOGLOBIN GLYCOSYLATED A1C: CPT

## 2022-07-02 PROCEDURE — 80061 LIPID PANEL: CPT

## 2022-07-07 ENCOUNTER — HOSPITAL ENCOUNTER (OUTPATIENT)
Dept: RADIOLOGY | Facility: MEDICAL CENTER | Age: 22
End: 2022-07-07
Attending: STUDENT IN AN ORGANIZED HEALTH CARE EDUCATION/TRAINING PROGRAM
Payer: COMMERCIAL

## 2022-07-07 DIAGNOSIS — R19.03 RIGHT LOWER QUADRANT ABDOMINAL MASS: ICD-10-CM

## 2022-07-07 PROCEDURE — 76705 ECHO EXAM OF ABDOMEN: CPT

## 2022-07-13 ENCOUNTER — OFFICE VISIT (OUTPATIENT)
Dept: MEDICAL GROUP | Facility: MEDICAL CENTER | Age: 22
End: 2022-07-13
Payer: COMMERCIAL

## 2022-07-13 VITALS
HEIGHT: 70 IN | OXYGEN SATURATION: 100 % | TEMPERATURE: 97.3 F | BODY MASS INDEX: 18.32 KG/M2 | SYSTOLIC BLOOD PRESSURE: 116 MMHG | DIASTOLIC BLOOD PRESSURE: 76 MMHG | HEART RATE: 66 BPM | WEIGHT: 128 LBS

## 2022-07-13 DIAGNOSIS — K40.90 NON-RECURRENT UNILATERAL INGUINAL HERNIA WITHOUT OBSTRUCTION OR GANGRENE: ICD-10-CM

## 2022-07-13 DIAGNOSIS — Z23 NEED FOR VACCINATION: ICD-10-CM

## 2022-07-13 PROBLEM — R19.03 RIGHT LOWER QUADRANT ABDOMINAL MASS: Status: RESOLVED | Noted: 2022-06-29 | Resolved: 2022-07-13

## 2022-07-13 PROCEDURE — 90471 IMMUNIZATION ADMIN: CPT | Performed by: STUDENT IN AN ORGANIZED HEALTH CARE EDUCATION/TRAINING PROGRAM

## 2022-07-13 PROCEDURE — 90715 TDAP VACCINE 7 YRS/> IM: CPT | Performed by: STUDENT IN AN ORGANIZED HEALTH CARE EDUCATION/TRAINING PROGRAM

## 2022-07-13 PROCEDURE — 99395 PREV VISIT EST AGE 18-39: CPT | Mod: 25 | Performed by: STUDENT IN AN ORGANIZED HEALTH CARE EDUCATION/TRAINING PROGRAM

## 2022-07-13 ASSESSMENT — ENCOUNTER SYMPTOMS
COUGH: 0
SPUTUM PRODUCTION: 0
VOMITING: 0
FEVER: 0
SHORTNESS OF BREATH: 0
CHILLS: 0
ABDOMINAL PAIN: 0
PALPITATIONS: 0
BLOOD IN STOOL: 0

## 2022-07-13 ASSESSMENT — FIBROSIS 4 INDEX: FIB4 SCORE: 0.21

## 2022-07-13 NOTE — PROGRESS NOTES
Subjective:     Subjective:     CC:   Chief Complaint   Patient presents with   • Annual Exam       HPI:   Jaydon Wayne is a 21 y.o. male who presents for annual exam    Last Colorectal Cancer Screening: NA  Last Tdap: NA  Received HPV series: Yes  Hx STDs: No    Exercise: moderate regular exercise, aerobic < 3 days a week  Diet: patient eats a lot of red meat    He  has a past medical history of Anesthesia, Anxiety, Environmental allergies, Hypothyroid (5/15/2014), Infectious disease, and Snoring.  He  has a past surgical history that includes other; tonsillectomy and adenoidectomy (12/28/2011); and wide excision (8/25/2016).    Family History   Problem Relation Age of Onset   • Hypertension Mother    • Allergies Mother    • Hypertension Father    • Allergies Father    • Thyroid Father    • Psychiatric Illness Father         social anxiety   • Hypertension Maternal Grandmother    • Heart Disease Maternal Grandfather    • Hypertension Maternal Grandfather    • Lupus Paternal Grandmother      Social History     Tobacco Use   • Smoking status: Never Smoker   • Smokeless tobacco: Never Used   Vaping Use   • Vaping Use: Never used   Substance Use Topics   • Alcohol use: No   • Drug use: No     He  reports being sexually active and has had partner(s) who are female.    Patient Active Problem List    Diagnosis Date Noted   • Non-recurrent unilateral inguinal hernia without obstruction or gangrene 07/13/2022   • Depression, recurrent (HCC) 01/26/2018   • Generalized anxiety disorder 12/15/2016   • Hypothyroid 05/15/2014   • Environmental allergies 05/03/2011     Current Outpatient Medications   Medication Sig Dispense Refill   • levothyroxine (SYNTHROID) 75 MCG Tab TAKE 1 TABLET BY MOUTH EVERY DAY (Patient not taking: Reported on 6/29/2022) 90 tablet 1     No current facility-administered medications for this visit.     No Known Allergies    Review of Systems   Review of Systems   Constitutional: Negative for chills  "and fever.   Respiratory: Negative for cough, sputum production and shortness of breath.    Cardiovascular: Negative for chest pain and palpitations.   Gastrointestinal: Negative for abdominal pain, blood in stool and vomiting.        Objective:   /76 (BP Location: Left arm, Patient Position: Sitting, BP Cuff Size: Adult)   Pulse 66   Temp 36.3 °C (97.3 °F) (Temporal)   Ht 1.778 m (5' 10\")   Wt 58.1 kg (128 lb)   SpO2 100%   BMI 18.37 kg/m²      Wt Readings from Last 4 Encounters:   07/13/22 58.1 kg (128 lb)   06/29/22 58.5 kg (129 lb)   02/24/22 59 kg (130 lb)   02/21/22 59 kg (130 lb)           Physical Exam:  Physical Exam  Constitutional:       Appearance: Normal appearance.   HENT:      Head: Normocephalic and atraumatic.      Right Ear: Tympanic membrane and ear canal normal.      Left Ear: Tympanic membrane and ear canal normal.      Mouth/Throat:      Mouth: Mucous membranes are moist.      Pharynx: Oropharynx is clear.   Eyes:      Extraocular Movements: Extraocular movements intact.      Pupils: Pupils are equal, round, and reactive to light.   Neck:      Thyroid: No thyromegaly.   Cardiovascular:      Rate and Rhythm: Normal rate and regular rhythm.      Heart sounds: Normal heart sounds.   Pulmonary:      Effort: Pulmonary effort is normal.      Breath sounds: Normal breath sounds.   Abdominal:      General: Abdomen is flat. Bowel sounds are normal.      Palpations: Abdomen is soft.      Hernia: A hernia (less than 1cm) is present.   Musculoskeletal:      Cervical back: Normal range of motion and neck supple.      Right lower leg: No edema.      Left lower leg: No edema.   Lymphadenopathy:      Cervical: No cervical adenopathy.   Skin:     General: Skin is warm and dry.   Neurological:      General: No focal deficit present.      Mental Status: He is alert.             Assessment and Plan:     Problem List Items Addressed This Visit     Non-recurrent unilateral inguinal hernia without " obstruction or gangrene     Acute  -refer to general surgery for further evaluation           Relevant Orders    Referral to General Surgery      Other Visit Diagnoses     Need for vaccination        Relevant Orders    Tdap =>7yo IM           Health maintenance:  uptodate  Labs per orders  Immunizations per orders  Patient counseled about skin care, diet, supplements, and exercise.  Discussed diet and exercise     Follow-up: Return in about 1 year (around 7/13/2023) for Annual.

## 2022-08-16 ENCOUNTER — OFFICE VISIT (OUTPATIENT)
Dept: URGENT CARE | Facility: CLINIC | Age: 22
End: 2022-08-16
Payer: COMMERCIAL

## 2022-08-16 ENCOUNTER — HOSPITAL ENCOUNTER (OUTPATIENT)
Facility: MEDICAL CENTER | Age: 22
End: 2022-08-16
Attending: PHYSICIAN ASSISTANT
Payer: COMMERCIAL

## 2022-08-16 VITALS
HEART RATE: 102 BPM | RESPIRATION RATE: 18 BRPM | TEMPERATURE: 97.6 F | SYSTOLIC BLOOD PRESSURE: 106 MMHG | WEIGHT: 125 LBS | DIASTOLIC BLOOD PRESSURE: 70 MMHG | BODY MASS INDEX: 18.51 KG/M2 | HEIGHT: 69 IN | OXYGEN SATURATION: 99 %

## 2022-08-16 DIAGNOSIS — E06.9 THYROIDITIS: ICD-10-CM

## 2022-08-16 DIAGNOSIS — M79.10 MYALGIA: ICD-10-CM

## 2022-08-16 DIAGNOSIS — J02.9 SORE THROAT: ICD-10-CM

## 2022-08-16 PROCEDURE — 99213 OFFICE O/P EST LOW 20 MIN: CPT | Performed by: PHYSICIAN ASSISTANT

## 2022-08-16 PROCEDURE — 87070 CULTURE OTHR SPECIMN AEROBIC: CPT

## 2022-08-16 PROCEDURE — U0005 INFEC AGEN DETEC AMPLI PROBE: HCPCS

## 2022-08-16 PROCEDURE — U0003 INFECTIOUS AGENT DETECTION BY NUCLEIC ACID (DNA OR RNA); SEVERE ACUTE RESPIRATORY SYNDROME CORONAVIRUS 2 (SARS-COV-2) (CORONAVIRUS DISEASE [COVID-19]), AMPLIFIED PROBE TECHNIQUE, MAKING USE OF HIGH THROUGHPUT TECHNOLOGIES AS DESCRIBED BY CMS-2020-01-R: HCPCS

## 2022-08-16 ASSESSMENT — FIBROSIS 4 INDEX: FIB4 SCORE: 0.21

## 2022-08-16 NOTE — PROGRESS NOTES
Subjective:   Jaydon Wayne is a 21 y.o. male who presents for Malaise (Chills, sore throat, body aches x3 days. Pt states this is a monthly-reoccurring issue. )       ST and body aches x 2 days.  Has had similar issues that last 1 to 2 weeks monthly for over one year.  Has discussed with primary care provider.  No unintentional weight loss.  Some fatigue.  Does endorse painful swallowing.  Denies fever, does note some chills.  Has had fevers on occasion.  Has had Kate-Barr virus many years ago.  No known COVID exposures.  No known history of COVID.  He is not vaccinated.  He denies cough or shortness of breath.  Does seemingly have a diagnosis of hypothyroidism many years ago but had recent normal TSH.  In 2014 had thyroglobulin antibody elevation as well as antithyroglobulin.  He also had a positive JOSEMANUEL in 2019.        Medications:  levothyroxine Tabs    Allergies:             Patient has no known allergies.    Surgical History:         Past Surgical History:   Procedure Laterality Date    WIDE EXCISION  8/25/2016    Procedure: WIDE EXCISION LOCAL DERMOID CYST NASAL ;  Surgeon: Akbar Zurita M.D.;  Location: SURGERY SAME DAY [facility];  Service:     TONSILLECTOMY AND ADENOIDECTOMY  12/28/2011    Performed by VENKATESH ALLISON at SURGERY SAME DAY [facility]    OTHER      dental        Past Social Hx:  Jaydon Wayne  reports that he has never smoked. He has never used smokeless tobacco. He reports that he does not drink alcohol and does not use drugs.     Past Family Hx:   Jaydon Wayne family history includes Allergies in his father and mother; Heart Disease in his maternal grandfather; Hypertension in his father, maternal grandfather, maternal grandmother, and mother; Lupus in his paternal grandmother; Psychiatric Illness in his father; Thyroid in his father.       Problem list, medications, and allergies reviewed by myself today in Epic.     Objective:     /70   Pulse (!) 102   " Temp 36.4 °C (97.6 °F) (Temporal)   Resp 18   Ht 1.753 m (5' 9\")   Wt 56.7 kg (125 lb)   SpO2 99%   BMI 18.46 kg/m²     Physical Exam  Vitals and nursing note reviewed.   Constitutional:       General: He is not in acute distress.     Appearance: He is well-developed. He is not diaphoretic.   HENT:      Head: Normocephalic.      Mouth/Throat:      Pharynx: Uvula midline. Posterior oropharyngeal erythema present. No pharyngeal swelling, oropharyngeal exudate or uvula swelling.      Tonsils: No tonsillar exudate or tonsillar abscesses.   Eyes:      Extraocular Movements: Extraocular movements intact.      Conjunctiva/sclera: Conjunctivae normal.      Pupils: Pupils are equal, round, and reactive to light.   Cardiovascular:      Rate and Rhythm: Normal rate.   Pulmonary:      Effort: Pulmonary effort is normal. No respiratory distress.      Breath sounds: Normal breath sounds.   Musculoskeletal:         General: Normal range of motion.      Cervical back: Normal range of motion and neck supple.   Lymphadenopathy:      Cervical: No cervical adenopathy.   Skin:     General: Skin is warm and dry.   Neurological:      Mental Status: He is alert and oriented to person, place, and time.   Psychiatric:         Behavior: Behavior is cooperative.       Assessment/Plan:     Diagnosis and Associated Orders:     1. Myalgia    2. Sore throat  - CULTURE THROAT; Future  - SARS-CoV-2 PCR (24 hour In-House): Collect NP swab in VTM; Future    3. Thyroiditis  - Referral to Endocrinology      Comments/MDM:    Patient presents with episodic myalgias and sore throat.  He states they occur monthly and last 1 to 2 weeks over the past 1 year.  Will run throat culture and COVID PCR test.  Review of his thyroid labs demonstrate abnormality from 2014 in regards to thyroglobulin which was significantly elevated.  Will place referral to endocrinology for further evaluation.  Recent TSH was normal.  Did review recent labs, no obvious anemia.  " Normal WBC.  No significant abnormality seen in differential.  JOSEMANUEL also elevated.  Recommend further follow-up with PCP.  We will be in contact with patient via [employer] regarding results of testing above.    The patient's presenting symptoms and exam findings most likely are due to a viral etiology.     Symptomatic and supportive care:   Plenty of oral hydration and rest   Over the counter cough suppressant as directed.  Tylenol or ibuprofen for pain and fever as directed.   Warm salt water gargles for sore throat, soft foods, cool liquids.   Saline nasal spray, Flonase, and/or otc sudafed (if no history of hypertension) as a decongestant.   Infection control measures at home. Stay away from people, Hand washing, covering sneeze/cough, disinfect surfaces.   Remain home from work, school, and other populated environments while ill.  Overall, the patient is well-appearing. They are not hypoxic, afebrile, and a normal pulmonary exam.    Test for COVID-19 and influenza performed if applicable. Result will be reviewed by myself. We will call/message back for positive results only and appropriate further instructions. Instructed to sign up for [employer] if they have not already. Result will be automatically released to [employer] application for patient review.     Patient should to proceed to ED for development of symptoms including but not limited to shortness of breath breath, respiratory distress, increased fever, or worsening symptoms not manageable at home.      I personally reviewed prior external notes and test results pertinent to today's visit.  Red flags discussed as well as indications to present to the Emergency Department.  Supportive care, natural history, differential diagnoses, and indications for immediate follow-up discussed.  Patient expresses understanding and agrees to plan.  Patient denies any other questions or concerns.    Follow-up with the primary care physician for recheck, reevaluation, and  consideration of further management.      Please note that this dictation was created using voice recognition software. I have made a reasonable attempt to correct obvious errors, but I expect that there are errors of grammar and possibly content that I did not discover before finalizing the note.    This note was electronically signed by Jenelle Barreto PA-C

## 2022-08-16 NOTE — LETTER
August 16, 2022    To Whom It May Concern:         This is confirmation that Jaydon Wayne attended his scheduled appointment with Jenelle Barreto P.A.-C. on 8/16/22. Please excuse patient from work today.           If you have any questions please do not hesitate to call me at the phone number listed below.    Sincerely,          Jenelle Barreto P.A.-C.  [phone]

## 2022-08-17 DIAGNOSIS — J02.9 SORE THROAT: ICD-10-CM

## 2022-08-19 LAB
BACTERIA SPEC RESP CULT: NORMAL
SIGNIFICANT IND 70042: NORMAL
SITE SITE: NORMAL
SOURCE SOURCE: NORMAL

## 2023-07-18 ENCOUNTER — TELEPHONE (OUTPATIENT)
Dept: MEDICAL GROUP | Facility: LAB | Age: 23
End: 2023-07-18

## 2023-07-18 NOTE — LETTER
7/18/2023    Jaydon Wayne  [address] [place] [address]    Dear Jaydon,    Your care is very important to us, and we have noticed that on 7/18/2023, you missed your appointment with Prashant West D.O. at [facility]    We’re committed to providing you with the best care possible. Your appointment time is reserved for you and your provider to discuss any current or new health concerns and, together, determine the best plan of care for you. Please call [phone] to reschedule at your earliest convenience.    In some cases, [facility] offers additional resources to make your healthcare more accessible, including transportation assistance, financial assistance and virtual visits. To learn more about these resources, please call [phone].    In order to keep you as informed as possible, below is a brief summary of our policy regarding missed appointments:  If a patient “No Shows”  three (3) or more appointments within a rolling 12-month period, they may be dismissed from the practice for failure to follow clinician recommendations.     If you have any concerns regarding the care you are receiving, please talk with your provider or call the office at [phone] and request to speak with the Practice . We’re committed to providing excellent care, and your feedback is invaluable.    Sincerely,    Prashant West D.O.

## 2024-05-12 ENCOUNTER — OFFICE VISIT (OUTPATIENT)
Dept: URGENT CARE | Facility: CLINIC | Age: 24
End: 2024-05-12
Payer: COMMERCIAL

## 2024-05-12 ENCOUNTER — HOSPITAL ENCOUNTER (OUTPATIENT)
Facility: MEDICAL CENTER | Age: 24
End: 2024-05-12
Attending: REGISTERED NURSE
Payer: COMMERCIAL

## 2024-05-12 VITALS
BODY MASS INDEX: 19.33 KG/M2 | SYSTOLIC BLOOD PRESSURE: 118 MMHG | HEIGHT: 69 IN | WEIGHT: 130.5 LBS | OXYGEN SATURATION: 98 % | DIASTOLIC BLOOD PRESSURE: 64 MMHG | HEART RATE: 80 BPM | TEMPERATURE: 97.6 F | RESPIRATION RATE: 15 BRPM

## 2024-05-12 DIAGNOSIS — Z72.51 HIGH RISK HETEROSEXUAL BEHAVIOR: ICD-10-CM

## 2024-05-12 DIAGNOSIS — R36.9 PENILE DISCHARGE: ICD-10-CM

## 2024-05-12 DIAGNOSIS — R30.0 DYSURIA: ICD-10-CM

## 2024-05-12 LAB
APPEARANCE UR: NORMAL
BILIRUB UR STRIP-MCNC: NEGATIVE MG/DL
COLOR UR AUTO: NORMAL
GLUCOSE UR STRIP.AUTO-MCNC: NEGATIVE MG/DL
KETONES UR STRIP.AUTO-MCNC: NORMAL MG/DL
LEUKOCYTE ESTERASE UR QL STRIP.AUTO: NORMAL
NITRITE UR QL STRIP.AUTO: NEGATIVE
PH UR STRIP.AUTO: 6.5 [PH] (ref 5–8)
PROT UR QL STRIP: NEGATIVE MG/DL
RBC UR QL AUTO: NEGATIVE
SP GR UR STRIP.AUTO: 1.03
UROBILINOGEN UR STRIP-MCNC: NORMAL MG/DL

## 2024-05-12 PROCEDURE — 3078F DIAST BP <80 MM HG: CPT | Performed by: REGISTERED NURSE

## 2024-05-12 PROCEDURE — 99214 OFFICE O/P EST MOD 30 MIN: CPT | Performed by: REGISTERED NURSE

## 2024-05-12 PROCEDURE — 81002 URINALYSIS NONAUTO W/O SCOPE: CPT | Performed by: REGISTERED NURSE

## 2024-05-12 PROCEDURE — 3074F SYST BP LT 130 MM HG: CPT | Performed by: REGISTERED NURSE

## 2024-05-12 RX ORDER — DOXYCYCLINE HYCLATE 100 MG
100 TABLET ORAL 2 TIMES DAILY
Qty: 14 TABLET | Refills: 0 | Status: SHIPPED | OUTPATIENT
Start: 2024-05-12 | End: 2024-05-19

## 2024-05-12 ASSESSMENT — ENCOUNTER SYMPTOMS
VOMITING: 0
SHORTNESS OF BREATH: 0
DIZZINESS: 0
ABDOMINAL PAIN: 0
FLANK PAIN: 0
NAUSEA: 0
FEVER: 0
CHILLS: 0

## 2024-05-12 ASSESSMENT — FIBROSIS 4 INDEX: FIB4 SCORE: 0.23

## 2024-05-12 NOTE — PROGRESS NOTES
Subjective:   Jaydon Wayne is a 23 y.o. male who presents for UTI (Discharge, painful urination x 5 days)    HPI  X 5 days has had some urinary discomfort as well as penile discharge.  Does report unprotected heterosexual encounters.  No history of STDs.  No treatments tried.  No other pertinent medical history.  Tolerating p.o. denies testicular pain or swelling.    Review of Systems   Constitutional:  Negative for chills, fever and malaise/fatigue.   Respiratory:  Negative for shortness of breath.    Cardiovascular:  Negative for chest pain.   Gastrointestinal:  Negative for abdominal pain, nausea and vomiting.   Genitourinary:  Positive for dysuria. Negative for flank pain, frequency, hematuria and urgency.        + Penile discharge   Neurological:  Negative for dizziness.       No Known Allergies    Patient Active Problem List    Diagnosis Date Noted    Non-recurrent unilateral inguinal hernia without obstruction or gangrene 07/13/2022    Depression, recurrent (HCC) 01/26/2018    Generalized anxiety disorder 12/15/2016    Hypothyroid 05/15/2014    Environmental allergies 05/03/2011       Current Outpatient Medications Ordered in Epic   Medication Sig Dispense Refill    doxycycline (VIBRAMYCIN) 100 MG Tab Take 1 Tablet by mouth 2 times a day for 7 days. 14 Tablet 0    levothyroxine (SYNTHROID) 75 MCG Tab TAKE 1 TABLET BY MOUTH EVERY DAY (Patient not taking: Reported on 6/29/2022) 90 tablet 1     Current Facility-Administered Medications Ordered in Epic   Medication Dose Route Frequency Provider Last Rate Last Admin    cefTRIAXone (Rocephin) 500 mg in lidocaine (Xylocaine) 1 % 2 mL for IM use  500 mg Intramuscular Once Oseas Calderon A.P.R.N.           Past Surgical History:   Procedure Laterality Date    WIDE EXCISION  8/25/2016    Procedure: WIDE EXCISION LOCAL DERMOID CYST NASAL ;  Surgeon: Akbar Zurita M.D.;  Location: SURGERY SAME DAY [facility];  Service:     TONSILLECTOMY AND ADENOIDECTOMY   "12/28/2011    Performed by VENKATESH ALLISON at SURGERY SAME DAY [facility]    OTHER      dental        Social History     Tobacco Use    Smoking status: Never    Smokeless tobacco: Never   Vaping Use    Vaping Use: Never used   Substance Use Topics    Alcohol use: No    Drug use: No       family history includes Allergies in his father and mother; Heart Disease in his maternal grandfather; Hypertension in his father, maternal grandfather, maternal grandmother, and mother; Lupus in his paternal grandmother; Psychiatric Illness in his father; Thyroid in his father.     Problem list, medications, and allergies reviewed by myself today in Epic.     Objective:   /64   Pulse 80   Temp 36.4 °C (97.6 °F) (Temporal)   Resp 15   Ht 1.753 m (5' 9\")   Wt 59.2 kg (130 lb 8 oz)   SpO2 98%   BMI 19.27 kg/m²     Physical Exam  Vitals and nursing note reviewed.   Constitutional:       General: He is not in acute distress.     Appearance: Normal appearance. He is not ill-appearing or toxic-appearing.   HENT:      Head: Normocephalic.      Mouth/Throat:      Mouth: Mucous membranes are moist.   Eyes:      General: No scleral icterus.  Cardiovascular:      Rate and Rhythm: Normal rate and regular rhythm.   Pulmonary:      Effort: Pulmonary effort is normal.      Breath sounds: Normal breath sounds.   Abdominal:      General: Abdomen is flat.      Palpations: Abdomen is soft.      Tenderness: There is no abdominal tenderness. There is no right CVA tenderness, left CVA tenderness or guarding.   Musculoskeletal:         General: Normal range of motion.      Cervical back: Normal range of motion.   Skin:     General: Skin is warm.      Capillary Refill: Capillary refill takes less than 2 seconds.   Neurological:      General: No focal deficit present.      Mental Status: He is alert and oriented to person, place, and time.   Psychiatric:         Mood and Affect: Mood normal.         Assessment/Plan:     I personally " reviewed prior external notes and test results pertinent to today's visit as well as additional imaging and testing completed in clinic today. Shared decision-making was utilized with patient for treatment plan.     1. Dysuria  POCT Urinalysis      2. Penile discharge  Chlamydia/GC, PCR (Urine)    doxycycline (VIBRAMYCIN) 100 MG Tab    cefTRIAXone (Rocephin) 500 mg in lidocaine (Xylocaine) 1 % 2 mL for IM use      3. High risk heterosexual behavior  Chlamydia/GC, PCR (Urine)    doxycycline (VIBRAMYCIN) 100 MG Tab    cefTRIAXone (Rocephin) 500 mg in lidocaine (Xylocaine) 1 % 2 mL for IM use        Is a very pleasant 23-year-old male presenting for penile discharge and dysuria x 5 days.  Has had unprotected heterosexual encounters.  No history of STDs.  No pertinent medical history.  Partner was of unknown status.  Thankfully his vitals are reassuring.  Completely unremarkable exam findings.  Did complete a UA in clinic which had trace ketones and trace leuks.  Based on symptoms we will treat empirically for gonorrhea and chlamydia.  Urine will be sent to test for these as well.  He is to notify partners.  Abstinence till treatment completed.    Medication discussed included indication for use and the potential benefits and side effects. Education was provided regarding the aforementioned assessments. All of the patient's questions were answered to their satisfaction at the time of discharge. Patient was encouraged to monitor symptoms closely and we reviewed the signs and symptoms which would warrant concern and mandate seeking a higher level of service through the emergency department. Patient stated agreement and understanding of this plan of care.     Please note that this dictation was created using voice recognition software. I have made every reasonable attempt to correct obvious errors, but I expect that there are errors of grammar and possibly content that I did not discover before finalizing the note.    This  note was electronically signed by DIANA Fraga

## 2024-05-13 LAB
C TRACH DNA SPEC QL NAA+PROBE: NEGATIVE
N GONORRHOEA DNA SPEC QL NAA+PROBE: NEGATIVE
SPECIMEN SOURCE: NORMAL